# Patient Record
Sex: MALE | Race: WHITE
[De-identification: names, ages, dates, MRNs, and addresses within clinical notes are randomized per-mention and may not be internally consistent; named-entity substitution may affect disease eponyms.]

---

## 2019-03-17 ENCOUNTER — HOSPITAL ENCOUNTER (EMERGENCY)
Dept: HOSPITAL 11 - JP.ED | Age: 24
Discharge: HOME | End: 2019-03-17
Payer: MEDICAID

## 2019-03-17 DIAGNOSIS — M10.9: Primary | ICD-10-CM

## 2019-03-17 DIAGNOSIS — F17.210: ICD-10-CM

## 2019-03-17 PROCEDURE — 84550 ASSAY OF BLOOD/URIC ACID: CPT

## 2019-03-17 PROCEDURE — 99283 EMERGENCY DEPT VISIT LOW MDM: CPT

## 2019-03-17 PROCEDURE — 96372 THER/PROPH/DIAG INJ SC/IM: CPT

## 2019-03-17 PROCEDURE — 36415 COLL VENOUS BLD VENIPUNCTURE: CPT

## 2019-03-17 NOTE — EDM.PDOC
ED HPI GENERAL MEDICAL PROBLEM





- General


Chief Complaint: Lower Extremity Injury/Pain


Stated Complaint: RT BIG TOE PAIN


Time Seen by Provider: 03/17/19 20:05


Source of Information: Reports: Patient, Family, RN Notes Reviewed


History Limitations: Reports: No Limitations





- History of Present Illness


INITIAL COMMENTS - FREE TEXT/NARRATIVE: 





24-year-old gentleman presents to the emergency department today with pain in 

his great toe on his right foot states it's really developed over the last 24 

hours is warm to the touch difficult for him to move he does have a history 

gout in the family


  ** right great toe


Pain Score (Numeric/FACES): 9





- Related Data


 Allergies











Allergy/AdvReac Type Severity Reaction Status Date / Time


 


No Known Allergies Allergy   Verified 03/17/19 19:53











Home Meds: 


 Home Meds





Naproxen Sodium [Aleve] 1 tab PO Q6H 03/17/19 [History]


guaiFENesin [Mucinex] 1 tab PO Q12H 03/17/19 [History]











Past Medical History


Musculoskeletal History: Reports: Fracture, Other (See Below)


Other Musculoskeletal History: fingertips





- Infectious Disease History


Infectious Disease History: Reports: Chicken Pox





Social & Family History





- Tobacco Use


Smoking Status *Q: Heavy Tobacco Smoker


Years of Tobacco use: 6


Packs/Tins Daily: 1





- Caffeine Use


Caffeine Use: Reports: Soda





- Recreational Drug Use


Recreational Drug Use: No





Review of Systems





- Review of Systems


Review Of Systems: See Below


Musculoskeletal: Reports: Foot Pain


Skin: Reports: Pallor





ED EXAM, GENERAL





- Physical Exam


Exam: See Below


Free Text/Narrative:: 





Examination of the foot right side pedal pulse +2 slight erythema is noted over 

the great toe as well as some warmth he is tender to the touch over the great 

toe


Exam Limited By: No Limitations


General Appearance: Alert, WD/WN, No Apparent Distress





Course





- Vital Signs


Last Recorded V/S: 


 Last Vital Signs











Temp  95.7 F   03/17/19 19:54


 


Pulse  69   03/17/19 19:54


 


Resp  14   03/17/19 19:54


 


BP  157/79 H  03/17/19 19:54


 


Pulse Ox  96   03/17/19 19:54














- Orders/Labs/Meds


Labs: 


 Laboratory Tests











  03/17/19 Range/Units





  20:10 


 


Uric Acid  8.0 H  (3.5-7.2)  mg/dL











Meds: 


Medications














Discontinued Medications














Generic Name Dose Route Start Last Admin





  Trade Name Sailaja  PRN Reason Stop Dose Admin


 


Ketorolac Tromethamine  60 mg  03/17/19 20:10  03/17/19 20:25





  Toradol  IM  03/17/19 20:11  60 mg





  ONETIME ONE   Administration





     





     





     





     














Departure





- Departure


Time of Disposition: 20:59


Disposition: Home, Self-Care 01


Condition: Fair


Clinical Impression: 


Gout attack


Qualifiers:


 Gout site: toe Gout etiology: unspecified cause Laterality: right Qualified 

Code(s): M10.9 - Gout, unspecified








- Discharge Information


Referrals: 


PCP,None [Primary Care Provider] - 


Forms:  ED Department Discharge


Additional Instructions: 


Try the colchicine 2 tablets followed by 1 tablet 1 hour later  Please followup 

with your primary care provider in 3-5 days if not better, please call return 

to the emergency department with worsening of symptoms.





- Assessment/Plan


Plan: 





Assessment





Acuity = acute





Site and laterality = gout flareup great toe right foot  





Etiology  = unclear etiology  





Manifestations = none  





Location of injury =  Home





Lab values = uric acid elevated at 8 





Plan


Prescription written for culture seen follow-up with primary care 3-5 days if 

no improvement

















 This note was dictated using dragon voice recognition software please call 

with any questions on syntax or grammar.

## 2021-05-15 NOTE — EDM.PDOC
ED HPI GENERAL MEDICAL PROBLEM





- General


Chief Complaint: Bite:Animal, Insect


Stated Complaint: TIC BITE


Time Seen by Provider: 05/15/21 13:05


Source of Information: Reports: Patient


History Limitations: Reports: No Limitations





- History of Present Illness


INITIAL COMMENTS - FREE TEXT/NARRATIVE: 





Pt with tick bite approximately one week ago.  Now he notes a classic 'bulls 

eye' rash.  Pt denies symptoms, no numbness, no joint pain.  He is here today 

because he noticed the rash and didn't want to get sick. 


Onset: Sudden


Onset Date: 05/08/21


Onset Time: 20:00


Duration: Getting Worse


Location: Reports: Back (right mid back)


Severity: Mild


Improves with: Reports: None


Worsens with: Reports: None


Context: Reports: Other (insect bite)


Associated Symptoms: Reports: No Other Symptoms





- Related Data


                                    Allergies











Allergy/AdvReac Type Severity Reaction Status Date / Time


 


No Known Allergies Allergy   Verified 05/15/21 12:50











Home Meds: 


                                    Home Meds





Doxycycline [Vibramycin] 100 mg PO BID 14 Days #28 cap 05/15/21 [Rx]











Past Medical History





- Past Health History


Medical/Surgical History: Denies Medical/Surgical History


Musculoskeletal History: Reports: Fracture, Other (See Below)


Other Musculoskeletal History: fingertips





- Infectious Disease History


Infectious Disease History: Reports: Chicken Pox





Social & Family History





- Caffeine Use


Caffeine Use: Reports: Coffee, Energy Drinks, Soda, Tea





- Recreational Drug Use


Recreational Drug Use: No





ED ROS GENERAL





- Review of Systems


Review Of Systems: See Below


Constitutional: Reports: No Symptoms


HEENT: Reports: No Symptoms


Respiratory: Reports: No Symptoms


Cardiovascular: Reports: No Symptoms


GI/Abdominal: Reports: No Symptoms


Musculoskeletal: Reports: No Symptoms


Skin: Reports: Lesions (tick bite - tick removed by pt one week ago)


Neurological: Reports: No Symptoms


Psychiatric: Reports: No Symptoms





ED EXAM, ANIMAL BITE





- Physical Exam


Exam: See Below


Exam Limited By: No Limitations


General Appearance: Alert, WD/WN, No Apparent Distress


Respiratory/Chest: No Respiratory Distress


Cardiovascular: Normal Peripheral Pulses


Neurological: Alert, Oriented, CN II-XII Intact


Skin Exam: Rash (classic bulls eye from tick bite approx 3 cm roun with central 

clearing)


Lymphadenopathy: Bilateral: No Adenopathy





Course





- Vital Signs


Last Recorded V/S: 


                                Last Vital Signs











Temp  36.4 C   05/15/21 12:53


 


Pulse  55 L  05/15/21 12:53


 


Resp  20   05/15/21 12:53


 


BP  118/59 L  05/15/21 12:53


 


Pulse Ox  96   05/15/21 12:53














Departure





- Departure


Time of Disposition: 13:32


Disposition: Home, Self-Care 01


Condition: Good


Clinical Impression: 


 Tick bite of back








- Discharge Information


*PRESCRIPTION DRUG MONITORING PROGRAM REVIEWED*: Not Applicable


Prescriptions: 


Doxycycline [Vibramycin] 100 mg PO BID 14 Days #28 cap


Instructions:  Animal Bite, Adult, Easy-to-Read


Referrals: 


PCP,None [Primary Care Provider] - 


Forms:  ED Department Discharge


Additional Instructions: 


Followup with primary care provider if develop symptoms after taking antibiotic 

or bite show signs or symptoms of infection





Sepsis Event Note (ED)





- Evaluation


Sepsis Screening Result: No Definite Risk





- Focused Exam


Vital Signs: 


                                   Vital Signs











  Temp Pulse Resp BP Pulse Ox


 


 05/15/21 12:53  36.4 C  55 L  20  118/59 L  96